# Patient Record
Sex: FEMALE | Race: BLACK OR AFRICAN AMERICAN | NOT HISPANIC OR LATINO | URBAN - METROPOLITAN AREA
[De-identification: names, ages, dates, MRNs, and addresses within clinical notes are randomized per-mention and may not be internally consistent; named-entity substitution may affect disease eponyms.]

---

## 2022-01-06 ENCOUNTER — EMERGENCY (EMERGENCY)
Facility: HOSPITAL | Age: 27
LOS: 1 days | Discharge: HOME | End: 2022-01-06
Attending: EMERGENCY MEDICINE | Admitting: EMERGENCY MEDICINE
Payer: COMMERCIAL

## 2022-01-06 VITALS
HEART RATE: 81 BPM | TEMPERATURE: 98 F | RESPIRATION RATE: 18 BRPM | SYSTOLIC BLOOD PRESSURE: 107 MMHG | DIASTOLIC BLOOD PRESSURE: 59 MMHG | OXYGEN SATURATION: 98 %

## 2022-01-06 VITALS
SYSTOLIC BLOOD PRESSURE: 104 MMHG | TEMPERATURE: 98 F | HEART RATE: 79 BPM | HEIGHT: 67 IN | OXYGEN SATURATION: 98 % | RESPIRATION RATE: 16 BRPM | DIASTOLIC BLOOD PRESSURE: 56 MMHG | WEIGHT: 164.91 LBS

## 2022-01-06 DIAGNOSIS — B34.9 VIRAL INFECTION, UNSPECIFIED: ICD-10-CM

## 2022-01-06 DIAGNOSIS — R42 DIZZINESS AND GIDDINESS: ICD-10-CM

## 2022-01-06 DIAGNOSIS — Z20.822 CONTACT WITH AND (SUSPECTED) EXPOSURE TO COVID-19: ICD-10-CM

## 2022-01-06 LAB
APPEARANCE UR: CLEAR — SIGNIFICANT CHANGE UP
BACTERIA # UR AUTO: ABNORMAL /HPF
BILIRUB UR-MCNC: NEGATIVE — SIGNIFICANT CHANGE UP
COLOR SPEC: YELLOW — SIGNIFICANT CHANGE UP
COMMENT - URINE: SIGNIFICANT CHANGE UP
DIFF PNL FLD: NEGATIVE — SIGNIFICANT CHANGE UP
EPI CELLS # UR: ABNORMAL /HPF (ref 0–5)
GLUCOSE UR QL: NEGATIVE — SIGNIFICANT CHANGE UP
HCG UR QL: NEGATIVE — SIGNIFICANT CHANGE UP
KETONES UR-MCNC: NEGATIVE — SIGNIFICANT CHANGE UP
LEUKOCYTE ESTERASE UR-ACNC: ABNORMAL
NITRITE UR-MCNC: NEGATIVE — SIGNIFICANT CHANGE UP
PH UR: 7.5 — SIGNIFICANT CHANGE UP (ref 5–8)
PROT UR-MCNC: NEGATIVE MG/DL — SIGNIFICANT CHANGE UP
RBC CASTS # UR COMP ASSIST: ABNORMAL /HPF
SARS-COV-2 RNA SPEC QL NAA+PROBE: SIGNIFICANT CHANGE UP
SP GR SPEC: 1.02 — SIGNIFICANT CHANGE UP (ref 1–1.03)
UROBILINOGEN FLD QL: 0.2 E.U./DL — SIGNIFICANT CHANGE UP
WBC UR QL: < 5 /HPF — SIGNIFICANT CHANGE UP

## 2022-01-06 PROCEDURE — 93010 ELECTROCARDIOGRAM REPORT: CPT

## 2022-01-06 PROCEDURE — 99053 MED SERV 10PM-8AM 24 HR FAC: CPT

## 2022-01-06 PROCEDURE — 99284 EMERGENCY DEPT VISIT MOD MDM: CPT

## 2022-01-06 NOTE — ED PROVIDER NOTE - PATIENT PORTAL LINK FT
You can access the FollowMyHealth Patient Portal offered by [facility] by registering at the following website: [email or web address]. By joining [employer]’s FollowMyHealth portal, you will also be able to view your health information using other applications (apps) compatible with our system.

## 2022-01-06 NOTE — ED PROVIDER NOTE - PROGRESS NOTE DETAILS
patient feeling a little better after eating a little food and drinking water. encouraged continued oral intake of fluids and sufficient calories. she is comfortable being discharged home and will return to ED if feeling worse.

## 2022-01-06 NOTE — ED ADULT TRIAGE NOTE - CHIEF COMPLAINT QUOTE
"I have been having a headache for a few days. Today I started feeling dizzy and nauseous. My back also hurts."

## 2022-01-06 NOTE — ED PROVIDER NOTE - NSFOLLOWUPINSTRUCTIONS_ED_ALL_ED_FT
Continue to rest, drink plenty of fluids, and make sure you're getting enough to eat. If you feel much worse, come back to the ER. If you decide to take medicine for your body aches, 2 tylenols every 4-6 hours is safe and effective.

## 2022-01-06 NOTE — ED PROVIDER NOTE - OBJECTIVE STATEMENT
26-year-old woman with history of endometriosis presenting to the ED complaining of ~1.5 weeks of headache, body aches, occasional nausea, chills, back pain, and lightheadedness. She had a COVID test a few days ago but hasn't gotten the results. She denies chest pain, dyspnea, cough, abdominal pain, dysuria. She hasn't taken any medications for her symptoms.

## 2022-01-06 NOTE — ED PROVIDER NOTE - CLINICAL SUMMARY MEDICAL DECISION MAKING FREE TEXT BOX
Differential diagnosis includes COVID-19, UTI, viral syndrome. I do not suspect meningitis, sepsis, ACS, arrhythmia. VS normal. Exam reassuring. Will test for COVID, UA, pregnancy, encourage fluid intake and give food. Differential diagnosis includes COVID-19, UTI, viral syndrome. I do not suspect meningitis, sepsis, ACS, arrhythmia. VS normal. Exam reassuring. Will test for COVID, UA, pregnancy, encourage fluid and food intake.

## 2022-01-07 LAB
CULTURE RESULTS: SIGNIFICANT CHANGE UP
SPECIMEN SOURCE: SIGNIFICANT CHANGE UP

## 2024-01-01 NOTE — ED PROVIDER NOTE - PHYSICAL EXAMINATION
Constitutional: awake and alert, in no acute distress  HEENT: head normocephalic and atraumatic. moist mucous membranes  Eyes: extraocular movements intact, normal conjunctiva  Neck: supple, normal ROM  Cardiovascular: regular rate   Pulmonary: no respiratory distress  Gastrointestinal: abdomen flat and nondistended  : mild left CVA tenderness vs musculoskeletal tenderness  Skin: warm, dry, normal for ethnicity  Musculoskeletal: no edema, no deformity  Neurological: oriented x4, no focal neurologic deficit. normal finger-nose-finger bilaterally  Psychiatric: calm and cooperative 46

## 2024-10-07 NOTE — ED PROVIDER NOTE - MUSCULOSKELETAL [+], MLM
09/24/2024 09/24/2024 Diphenoxylate Hcl-atropine Sulfate (Tablet) 60.0 30 0.025 MG-2.5 MG NA YURY PAEZ [id number] Commercial Insurance 0 / 0 PA      1 [id number] 09/10/2024 07/16/2024 clonazePAM (Tablet) 120.0 30 1 MG NA [id number] Commercial Insurance 2 / 2 PA    1 [id number] 08/13/2024 07/16/2024 clonazePAM (Tablet) 120.0 30 1 MG NA [id number] Commercial Insurance 1 / 2 PA    1 [id number] 07/24/2024 07/24/2024 Diphenoxylate Hcl-atropine Sulfate (Tablet) 60.0 30 0.025 MG-2.5 MG NA FRANCIE NEWELL [id number] Commercial Insurance 0 / 0 PA    1 [id number] 07/16/2024 07/16/2024 clonazePAM (Tablet) 120.0 30 1 MG NA [id number]              
Reason for call:   [x] Refill   [] Prior Auth  [] Other:     Office:   [] PCP/Provider -   [x] Specialty/Provider -  Elham Cho,      Medication: Klonopin    Dose/Frequency: 1 mg     Quantity: #120    Pharmacy: [facility] [address] [facility]    Does the patient have enough for 3 days?   [] Yes   [x] No - Send as HP to POD  
myalgias, back pain

## 2025-01-11 NOTE — ED PROVIDER NOTE - GASTROINTESTINAL [-], MLM
[Normal Conjunctiva] : normal conjunctiva [Abnormal Gait] : abnormal gait [No Edema] : no edema [Normal] : alert and oriented, normal memory [de-identified] : no acute distress [de-identified] : supple, no carotid bruits b/l [de-identified] : JVP ~ 7 cm H20, RRR, s1, s2, no murmurs [de-identified] : unlabored respirations, clear lung fields [de-identified] : obese no abdominal pain